# Patient Record
Sex: FEMALE | Race: BLACK OR AFRICAN AMERICAN | URBAN - METROPOLITAN AREA
[De-identification: names, ages, dates, MRNs, and addresses within clinical notes are randomized per-mention and may not be internally consistent; named-entity substitution may affect disease eponyms.]

---

## 2023-05-04 ENCOUNTER — INITIAL PRENATAL (OUTPATIENT)
Dept: OBGYN CLINIC | Age: 33
End: 2023-05-04

## 2023-05-04 ENCOUNTER — HOSPITAL ENCOUNTER (OUTPATIENT)
Dept: LAB | Age: 33
Discharge: HOME | End: 2023-05-04

## 2023-05-04 DIAGNOSIS — O20.0 THREATENED ABORTION: Primary | ICD-10-CM

## 2023-05-04 RX ORDER — MISOPROSTOL 200 UG/1
TABLET ORAL
Qty: 10 TABLET | Refills: 0 | Status: SHIPPED | OUTPATIENT
Start: 2023-05-04

## 2023-05-04 RX ORDER — HYDROCODONE BITARTRATE AND ACETAMINOPHEN 5; 325 MG/1; MG/1
1 TABLET ORAL
Qty: 5 TABLET | Refills: 0 | Status: SHIPPED | OUTPATIENT
Start: 2023-05-04 | End: 2023-05-07

## 2023-05-04 RX ORDER — ONDANSETRON 8 MG/1
8 TABLET, ORALLY DISINTEGRATING ORAL
Qty: 15 TABLET | Refills: 0 | Status: SHIPPED | OUTPATIENT
Start: 2023-05-04

## 2023-05-05 LAB
ABO + RH BLD: NORMAL
BLOOD BANK CMNT PATIENT-IMP: NORMAL
BLOOD GROUP ANTIBODIES SERPL: NORMAL
ERYTHROCYTE [DISTWIDTH] IN BLOOD BY AUTOMATED COUNT: 13.3 % (ref 11.5–14.5)
HCG SERPL-ACNC: 845 MIU/ML (ref 0–6)
HCT VFR BLD AUTO: 41.6 % (ref 35–47)
HGB BLD-MCNC: 12.9 G/DL (ref 11.5–16)
MCH RBC QN AUTO: 30 PG (ref 26–34)
MCHC RBC AUTO-ENTMCNC: 31 G/DL (ref 30–36.5)
MCV RBC AUTO: 96.7 FL (ref 80–99)
NRBC # BLD: 0 K/UL (ref 0–0.01)
NRBC BLD-RTO: 0 PER 100 WBC
PLATELET # BLD AUTO: 129 K/UL (ref 150–400)
PMV BLD AUTO: 12.2 FL (ref 8.9–12.9)
RBC # BLD AUTO: 4.3 M/UL (ref 3.8–5.2)
SPECIMEN EXP DATE BLD: NORMAL
WBC # BLD AUTO: 8.3 K/UL (ref 3.6–11)

## 2023-05-16 NOTE — PROGRESS NOTES
Magdaleno Pop is a 33 y.o. female presents for a follow-up visit for SAB. Had ultrasound in our office today. Patient saw our office on 5/4/2023 for threatened miscarriage and had ultrasound in our practice on 5/4/2023. At that visit, she reported having 3 weeks of heavy cramping and bleeding. The bleeding and cramping has since resolved. No chief complaint on file. No LMP recorded. Birth Control: none. Last Pap: 3/27/2023; NILM; HPV+    She reports the symptoms are resolved. Examination chaperoned by Su Squires LPN.

## 2023-05-18 ENCOUNTER — OFFICE VISIT (OUTPATIENT)
Age: 33
End: 2023-05-18
Payer: COMMERCIAL

## 2023-05-18 VITALS — WEIGHT: 197.2 LBS | SYSTOLIC BLOOD PRESSURE: 123 MMHG | DIASTOLIC BLOOD PRESSURE: 59 MMHG

## 2023-05-18 DIAGNOSIS — O03.9 COMPLETE ABORTION: Primary | ICD-10-CM

## 2023-05-18 PROCEDURE — 99212 OFFICE O/P EST SF 10 MIN: CPT | Performed by: OBSTETRICS & GYNECOLOGY

## 2023-05-18 NOTE — PROGRESS NOTES
OB/GYN Problem VIsit    HPI  Nimo Gloria is a No obstetric history on file. ,  33 y.o. female who presents for a problem visit. Followup SAB. Last US showed sac in the cervix. She was given Miso 1000mcg took twice and passed the tissue. Not  bleeding at all now. No SE from meds. Wants conception again    Pt states abnormal pap HPV pos in March. Needs colpo and wants to do that here. No past medical history on file. No past surgical history on file. Social History     Occupational History    Not on file   Tobacco Use    Smoking status: Not on file    Smokeless tobacco: Not on file   Substance and Sexual Activity    Alcohol use: Not on file    Drug use: Not on file    Sexual activity: Not on file     No family history on file.     No Known Allergies  Prior to Admission medications    Not on File        Review of Systems: History obtained from the patient  Constitutional: negative for weight loss, fever, night sweats  Breast: negative for breast lumps, nipple discharge, galactorrhea  GI: negative for change in bowel habits, abdominal pain, black or bloody stools  : negative for frequency, dysuria, hematuria, vaginal discharge  MSK: negative for back pain, joint pain, muscle pain  Skin: negative for itching, rash, hives  Psych: negative for anxiety, depression, change in mood      Objective:  BP (!) 123/59   Wt 197 lb 3.2 oz (89.4 kg)     Physical Exam:   PHYSICAL EXAMINATION    Constitutional  Appearance: well-nourished, well developed, alert, in no acute distress      Gastrointestinal  Abdominal Examination: abdomen non-tender to palpation, normal bowel sounds, no masses present  Liver and spleen: no hepatomegaly present, spleen not palpable  Hernias: no hernias identified    Genitourinary  External Genitalia: normal appearance for age, no discharge present, no tenderness present, no inflammatory lesions present, no masses present, no atrophy present  Vagina: normal vaginal vault without central or

## 2023-05-19 LAB — HCG SERPL-ACNC: 3 MIU/ML (ref 0–6)

## 2023-06-05 ENCOUNTER — PROCEDURE VISIT (OUTPATIENT)
Age: 33
End: 2023-06-05

## 2023-06-05 VITALS — WEIGHT: 198 LBS | SYSTOLIC BLOOD PRESSURE: 128 MMHG | DIASTOLIC BLOOD PRESSURE: 56 MMHG

## 2023-06-05 DIAGNOSIS — R87.618 ABNORMAL PAPANICOLAOU SMEAR OF CERVIX WITH POSITIVE HUMAN PAPILLOMA VIRUS (HPV) TEST: Primary | ICD-10-CM

## 2023-06-05 LAB
HCG, PREGNANCY, URINE, POC: NEGATIVE
VALID INTERNAL CONTROL, POC: YES

## 2023-06-05 NOTE — PROGRESS NOTES
Procedure Note: Colposcopy    Octaviano Clarke is a No obstetric history on file. ,  33 y.o. female Black /  No LMP recorded. She presents for colposcopy for evaluation of a cervical abnormality with a pap smear abnormality consisting of ASCUS and HPV. After being presented with the risks, benefits and alternatives has she signed a consent for the procedure. She states that she understands the need for the procedure and has no further questions. She was informed that she may experience discomfort. Procedure:  She was positioned in the dorsal lithotomy position and a speculum was inserted into the vagina. Dilute acetic acid was applied to the cervix. The colposcope was used to visualize the cervix. Procedure: The transformation zone was completely visualized. Findings: This colposcopy was satisfactory. The procedure was notable for white epithelium on the cervix. Biopsies were taken from the cervix . See accompanying diagram for biopsy sites. Monsels was applied to the cervix. Endocervical currettage: An endocervical curettage was performed. Post Procedure Status: The patient tolerated the procedure well with minimal discomfort. She was observed for 10 minutes and released in good condition.     Physical Exam  Genitourinary:          A: ASCUS HPV pos  Looks LG    P: LEEP if HG

## 2023-06-07 ENCOUNTER — TELEPHONE (OUTPATIENT)
Age: 33
End: 2023-06-07

## 2023-06-07 NOTE — TELEPHONE ENCOUNTER
Tyrel Ochoa from [facility] calling to verify biopsies received. She states the requisition indicated only Endocervical biopsy but received 2 formalin containers, one says ENDO, other says ECTO. This MA s/w Dr. Dameon Jackson who confirmed lab was to receive 2 specimens (ENDO+ECTO). Verbal order provided to Tyrel Ochoa. A new requisition was not needed.

## 2023-07-13 NOTE — PROGRESS NOTES
Dede Saba is a 33 y.o. female presents for a problem visit. Chief Complaint   Patient presents with    Other     Discuss LEEP     Patient's last menstrual period was 06/19/2023 (exact date). Birth Control: condoms always. Last Pap: 3/2023 abnormal Pap with HPV+ per pt   6/5/2023; Colpo CIN1; CIN2        Examination chaperoned by Juliocesar Green LPN.

## 2023-07-14 ENCOUNTER — OFFICE VISIT (OUTPATIENT)
Age: 33
End: 2023-07-14
Payer: COMMERCIAL

## 2023-07-14 VITALS — DIASTOLIC BLOOD PRESSURE: 84 MMHG | SYSTOLIC BLOOD PRESSURE: 135 MMHG | WEIGHT: 204 LBS

## 2023-07-14 DIAGNOSIS — N87.1 CIN II (CERVICAL INTRAEPITHELIAL NEOPLASIA II): Primary | ICD-10-CM

## 2023-07-14 PROCEDURE — 99212 OFFICE O/P EST SF 10 MIN: CPT | Performed by: OBSTETRICS & GYNECOLOGY

## 2023-07-14 NOTE — PROGRESS NOTES
OB/GYN Problem VIsit    HPI  Juli Weaver is a No obstetric history on file. ,  33 y.o. female who presents for a problem visit. Colposcopy revealed MARKO-2. She is here discussed LEEP procedure. Her only pregnancy has been a miscarriage. We discussed in detail the procedure and recovery. The risk benefits and alternatives were also discussed including doing nothing as these occasionally spontaneously resolved. The current recommendation is removal.        History reviewed. No pertinent past medical history. History reviewed. No pertinent surgical history.   Social History     Occupational History    Not on file   Tobacco Use    Smoking status: Never    Smokeless tobacco: Never   Vaping Use    Vaping Use: Never used   Substance and Sexual Activity    Alcohol use: Yes     Comment: occasional    Drug use: Never    Sexual activity: Yes     Partners: Male     Birth control/protection: Condom Male     Comment: always     Family History   Problem Relation Age of Onset    Breast Cancer Maternal Grandmother        No Known Allergies  Prior to Admission medications    Not on File        Review of Systems: History obtained from the patient  Constitutional: negative for weight loss, fever, night sweats  Breast: negative for breast lumps, nipple discharge, galactorrhea  GI: negative for change in bowel habits, abdominal pain, black or bloody stools  : negative for frequency, dysuria, hematuria, vaginal discharge  MSK: negative for back pain, joint pain, muscle pain  Skin: negative for itching, rash, hives  Psych: negative for anxiety, depression, change in mood      Objective:  /84   Wt 204 lb (92.5 kg)   LMP 06/19/2023 (Exact Date)     Physical Exam:   PHYSICAL EXAMINATION    Constitutional  Appearance: well-nourished, well developed, alert, in no acute distress      Skin  General Inspection: no rash, no lesions identified    Neurologic/Psychiatric  Mental Status:  Orientation: grossly oriented to person, place

## 2023-07-19 ENCOUNTER — PREP FOR PROCEDURE (OUTPATIENT)
Facility: HOSPITAL | Age: 33
End: 2023-07-19

## 2023-07-19 DIAGNOSIS — N87.1 CIN II (CERVICAL INTRAEPITHELIAL NEOPLASIA II): Primary | ICD-10-CM

## 2023-08-08 ENCOUNTER — TELEPHONE (OUTPATIENT)
Age: 33
End: 2023-08-08

## 2023-09-25 ENCOUNTER — TELEPHONE (OUTPATIENT)
Age: 33
End: 2023-09-25

## 2023-09-26 ENCOUNTER — PREP FOR PROCEDURE (OUTPATIENT)
Facility: HOSPITAL | Age: 33
End: 2023-09-26

## 2023-09-26 DIAGNOSIS — N87.1 CERVICAL INTRAEPITHELIAL NEOPLASIA II: Primary | ICD-10-CM

## 2023-09-27 ENCOUNTER — ANESTHESIA EVENT (OUTPATIENT)
Facility: HOSPITAL | Age: 33
End: 2023-09-27
Payer: COMMERCIAL

## 2023-09-27 ENCOUNTER — HOSPITAL ENCOUNTER (OUTPATIENT)
Facility: HOSPITAL | Age: 33
Setting detail: OUTPATIENT SURGERY
Discharge: HOME | End: 2023-09-27
Attending: OBSTETRICS & GYNECOLOGY | Admitting: OBSTETRICS & GYNECOLOGY
Payer: COMMERCIAL

## 2023-09-27 ENCOUNTER — ANESTHESIA (OUTPATIENT)
Facility: HOSPITAL | Age: 33
End: 2023-09-27
Payer: COMMERCIAL

## 2023-09-27 VITALS
HEART RATE: 88 BPM | DIASTOLIC BLOOD PRESSURE: 60 MMHG | SYSTOLIC BLOOD PRESSURE: 127 MMHG | TEMPERATURE: 98.3 F | WEIGHT: 201 LBS | RESPIRATION RATE: 18 BRPM | BODY MASS INDEX: 31.55 KG/M2 | HEIGHT: 67 IN | OXYGEN SATURATION: 96 %

## 2023-09-27 DIAGNOSIS — N87.1 CERVICAL INTRAEPITHELIAL NEOPLASIA II: ICD-10-CM

## 2023-09-27 LAB
COMMENT:: NORMAL
HCG UR QL: NEGATIVE
SPECIMEN HOLD: NORMAL

## 2023-09-27 PROCEDURE — 2500000003 HC RX 250 WO HCPCS: Performed by: OBSTETRICS & GYNECOLOGY

## 2023-09-27 PROCEDURE — 7100000011 HC PHASE II RECOVERY - ADDTL 15 MIN: Performed by: OBSTETRICS & GYNECOLOGY

## 2023-09-27 PROCEDURE — 6360000002 HC RX W HCPCS: Performed by: ANESTHESIOLOGY

## 2023-09-27 PROCEDURE — 6370000000 HC RX 637 (ALT 250 FOR IP): Performed by: ANESTHESIOLOGY

## 2023-09-27 PROCEDURE — 3700000001 HC ADD 15 MINUTES (ANESTHESIA): Performed by: OBSTETRICS & GYNECOLOGY

## 2023-09-27 PROCEDURE — 3600000013 HC SURGERY LEVEL 3 ADDTL 15MIN: Performed by: OBSTETRICS & GYNECOLOGY

## 2023-09-27 PROCEDURE — 7100000000 HC PACU RECOVERY - FIRST 15 MIN: Performed by: OBSTETRICS & GYNECOLOGY

## 2023-09-27 PROCEDURE — 88307 TISSUE EXAM BY PATHOLOGIST: CPT

## 2023-09-27 PROCEDURE — 2709999900 HC NON-CHARGEABLE SUPPLY: Performed by: OBSTETRICS & GYNECOLOGY

## 2023-09-27 PROCEDURE — 7100000001 HC PACU RECOVERY - ADDTL 15 MIN: Performed by: OBSTETRICS & GYNECOLOGY

## 2023-09-27 PROCEDURE — 36415 COLL VENOUS BLD VENIPUNCTURE: CPT

## 2023-09-27 PROCEDURE — 6370000000 HC RX 637 (ALT 250 FOR IP): Performed by: OBSTETRICS & GYNECOLOGY

## 2023-09-27 PROCEDURE — 57522 CONIZATION OF CERVIX: CPT | Performed by: OBSTETRICS & GYNECOLOGY

## 2023-09-27 PROCEDURE — 3600000003 HC SURGERY LEVEL 3 BASE: Performed by: OBSTETRICS & GYNECOLOGY

## 2023-09-27 PROCEDURE — 2580000003 HC RX 258: Performed by: ANESTHESIOLOGY

## 2023-09-27 PROCEDURE — 7100000010 HC PHASE II RECOVERY - FIRST 15 MIN: Performed by: OBSTETRICS & GYNECOLOGY

## 2023-09-27 PROCEDURE — 3700000000 HC ANESTHESIA ATTENDED CARE: Performed by: OBSTETRICS & GYNECOLOGY

## 2023-09-27 PROCEDURE — 88305 TISSUE EXAM BY PATHOLOGIST: CPT

## 2023-09-27 PROCEDURE — 81025 URINE PREGNANCY TEST: CPT

## 2023-09-27 RX ORDER — DEXAMETHASONE SODIUM PHOSPHATE 4 MG/ML
INJECTION, SOLUTION INTRA-ARTICULAR; INTRALESIONAL; INTRAMUSCULAR; INTRAVENOUS; SOFT TISSUE PRN
Status: DISCONTINUED | OUTPATIENT
Start: 2023-09-27 | End: 2023-09-27 | Stop reason: SDUPTHER

## 2023-09-27 RX ORDER — SODIUM CHLORIDE, SODIUM LACTATE, POTASSIUM CHLORIDE, CALCIUM CHLORIDE 600; 310; 30; 20 MG/100ML; MG/100ML; MG/100ML; MG/100ML
INJECTION, SOLUTION INTRAVENOUS CONTINUOUS
Status: DISCONTINUED | OUTPATIENT
Start: 2023-09-27 | End: 2023-09-27 | Stop reason: HOSPADM

## 2023-09-27 RX ORDER — ACETIC ACID 5 %
LIQUID (ML) MISCELLANEOUS PRN
Status: DISCONTINUED | OUTPATIENT
Start: 2023-09-27 | End: 2023-09-27 | Stop reason: ALTCHOICE

## 2023-09-27 RX ORDER — PROPOFOL 10 MG/ML
INJECTION, EMULSION INTRAVENOUS PRN
Status: DISCONTINUED | OUTPATIENT
Start: 2023-09-27 | End: 2023-09-27 | Stop reason: SDUPTHER

## 2023-09-27 RX ORDER — ACETAMINOPHEN 325 MG/1
650 TABLET ORAL ONCE
Status: COMPLETED | OUTPATIENT
Start: 2023-09-27 | End: 2023-09-27

## 2023-09-27 RX ORDER — IODINE SOLUTION STRONG 5% (LUGOL'S) 5 %
SOLUTION ORAL PRN
Status: DISCONTINUED | OUTPATIENT
Start: 2023-09-27 | End: 2023-09-27 | Stop reason: ALTCHOICE

## 2023-09-27 RX ORDER — LABETALOL HYDROCHLORIDE 5 MG/ML
10 INJECTION, SOLUTION INTRAVENOUS
Status: DISCONTINUED | OUTPATIENT
Start: 2023-09-27 | End: 2023-09-27 | Stop reason: HOSPADM

## 2023-09-27 RX ORDER — FERRIC SUBSULFATE 0.21 G/G
LIQUID TOPICAL PRN
Status: DISCONTINUED | OUTPATIENT
Start: 2023-09-27 | End: 2023-09-27 | Stop reason: ALTCHOICE

## 2023-09-27 RX ORDER — ONDANSETRON 2 MG/ML
INJECTION INTRAMUSCULAR; INTRAVENOUS PRN
Status: DISCONTINUED | OUTPATIENT
Start: 2023-09-27 | End: 2023-09-27 | Stop reason: SDUPTHER

## 2023-09-27 RX ORDER — DROPERIDOL 2.5 MG/ML
0.62 INJECTION, SOLUTION INTRAMUSCULAR; INTRAVENOUS
Status: DISCONTINUED | OUTPATIENT
Start: 2023-09-27 | End: 2023-09-27 | Stop reason: HOSPADM

## 2023-09-27 RX ORDER — LIDOCAINE HYDROCHLORIDE 10 MG/ML
1 INJECTION, SOLUTION EPIDURAL; INFILTRATION; INTRACAUDAL; PERINEURAL
Status: DISCONTINUED | OUTPATIENT
Start: 2023-09-27 | End: 2023-09-27 | Stop reason: HOSPADM

## 2023-09-27 RX ORDER — DIPHENHYDRAMINE HYDROCHLORIDE 50 MG/ML
12.5 INJECTION INTRAMUSCULAR; INTRAVENOUS
Status: DISCONTINUED | OUTPATIENT
Start: 2023-09-27 | End: 2023-09-27 | Stop reason: HOSPADM

## 2023-09-27 RX ORDER — ONDANSETRON 2 MG/ML
4 INJECTION INTRAMUSCULAR; INTRAVENOUS
Status: DISCONTINUED | OUTPATIENT
Start: 2023-09-27 | End: 2023-09-27 | Stop reason: HOSPADM

## 2023-09-27 RX ORDER — MEPERIDINE HYDROCHLORIDE 25 MG/ML
12.5 INJECTION INTRAMUSCULAR; INTRAVENOUS; SUBCUTANEOUS EVERY 5 MIN PRN
Status: DISCONTINUED | OUTPATIENT
Start: 2023-09-27 | End: 2023-09-27 | Stop reason: HOSPADM

## 2023-09-27 RX ORDER — LIDOCAINE HYDROCHLORIDE AND EPINEPHRINE 10; 10 MG/ML; UG/ML
INJECTION, SOLUTION INFILTRATION; PERINEURAL PRN
Status: DISCONTINUED | OUTPATIENT
Start: 2023-09-27 | End: 2023-09-27 | Stop reason: ALTCHOICE

## 2023-09-27 RX ORDER — MIDAZOLAM HYDROCHLORIDE 1 MG/ML
INJECTION INTRAMUSCULAR; INTRAVENOUS PRN
Status: DISCONTINUED | OUTPATIENT
Start: 2023-09-27 | End: 2023-09-27 | Stop reason: SDUPTHER

## 2023-09-27 RX ADMIN — ACETAMINOPHEN 650 MG: 325 TABLET ORAL at 06:25

## 2023-09-27 RX ADMIN — MIDAZOLAM HYDROCHLORIDE 2 MG: 1 INJECTION, SOLUTION INTRAMUSCULAR; INTRAVENOUS at 07:31

## 2023-09-27 RX ADMIN — ONDANSETRON HYDROCHLORIDE 4 MG: 2 SOLUTION INTRAMUSCULAR; INTRAVENOUS at 07:38

## 2023-09-27 RX ADMIN — PROPOFOL 200 MG: 10 INJECTION, EMULSION INTRAVENOUS at 07:38

## 2023-09-27 RX ADMIN — DEXAMETHASONE SODIUM PHOSPHATE 4 MG: 4 INJECTION, SOLUTION INTRAMUSCULAR; INTRAVENOUS at 07:38

## 2023-09-27 RX ADMIN — SODIUM CHLORIDE, POTASSIUM CHLORIDE, SODIUM LACTATE AND CALCIUM CHLORIDE: 600; 310; 30; 20 INJECTION, SOLUTION INTRAVENOUS at 06:26

## 2023-09-27 ASSESSMENT — PAIN - FUNCTIONAL ASSESSMENT: PAIN_FUNCTIONAL_ASSESSMENT: NONE - DENIES PAIN

## 2023-09-27 NOTE — ANESTHESIA POSTPROCEDURE EVALUATION
Department of Anesthesiology  Postprocedure Note    Patient: Michelle Trivedi  MRN: [id number]  YOB: 1990  Date of evaluation: 9/27/2023      Procedure Summary     Date: 09/27/23 Room / Location: [facility] MAIN OR  / [facility] MAIN OR    Anesthesia Start: 5051 Anesthesia Stop: 9291    Procedure: LOOP ELECTROSURGICAL EXCISION PROCEDURE (Cervix) Diagnosis:       Moderate dysplasia of cervix      (Moderate dysplasia of cervix [N87.1])    Surgeons: Pedro Pablo Mays MD Responsible Provider: Gab Hampton MD    Anesthesia Type: general ASA Status: 1          Anesthesia Type: No value filed.     Isrrael Phase I: Isrrael Score: 10    Isrrael Phase II:        Anesthesia Post Evaluation    Patient location during evaluation: PACU  Patient participation: complete - patient participated  Level of consciousness: awake  Airway patency: patent  Nausea & Vomiting: no vomiting and no nausea  Complications: no  Cardiovascular status: hemodynamically stable  Respiratory status: acceptable  Hydration status: stable  Pain management: adequate

## 2023-09-27 NOTE — OP NOTE
Operative Note      Patient: Prabhu Boone  YOB: 1990  MRN: [id number]    Date of Procedure: 9/27/2023    Pre-Op Diagnosis Codes:     * Moderate dysplasia of cervix [N87.1]    Post-Op Diagnosis: Same       Procedure(s):  LOOP ELECTROSURGICAL EXCISION PROCEDURE, ECC, attempted colpo    Surgeon(s):  Salazar Srinivasan MD    Assistant:   * No surgical staff found *    Anesthesia: Monitor Anesthesia Care    Estimated Blood Loss (mL): Minimal    Complications: None    Specimens:   ID Type Source Tests Collected by Time Destination   A : ectocervical LEEP Tissue Cervix SURGICAL PATHOLOGY Salazar Srinivasan MD 9/27/2023 0803    B : Endocervical LEEP Tissue Cervix SURGICAL PATHOLOGY Salazar Srinivasan MD 9/27/2023 0809    C : Endocervical Curettings Tissue Cervix SURGICAL PATHOLOGY Salazar Srinivasan MD 9/27/2023 8080        Implants:  * No implants in log *      Drains: * No LDAs found *    Findings: lesion noted on ectocervix        Detailed Description of Procedure:   After proper patient identification and consent were obtained the patient was taken to the operating room where after adequate induction of MAC anesthesia she was placed in the dorsolithotomy position. A coated speculum was placed in the vagina and the cervix was visualized. It was painted with Lugol's solution and area of nonstaining was noted. Colposcopy was attempted but the colposcope was broken. The cervix was then injected with 10 cc of lidocaine with epinephrine in all 4 quadrants. Using a medium size loop and ectocervical specimen was obtained and sent for pathology using a 10 x 10 mm loop and endocervical specimen was obtained in a Top-Hat fashion and sent for pathology. Using a small curette and endocervical curettage was performed and all quadrants of the endocervical canal and a small specimen was sent for pathology. Hemostasis was obtained with the aid of rollerball cautery and Monsel solution.   At the end of the procedure pad needle and sponge

## 2023-10-04 NOTE — PROGRESS NOTES
Henry Marie is a 29 y.o. female presents for a problem visit. Chief Complaint   Patient presents with    Follow-up     Patient's last menstrual period was 09/28/2023. The patient is reporting having:  Follow up to discuss LEEP 9/27/2023, patient states she had no issues after LEEP but has started bleeding today and it is not her cycle      Examination chaperoned by Anjelica Christensen MA.

## 2023-10-13 ENCOUNTER — OFFICE VISIT (OUTPATIENT)
Age: 33
End: 2023-10-13
Payer: COMMERCIAL

## 2023-10-13 VITALS — BODY MASS INDEX: 32.11 KG/M2 | WEIGHT: 205 LBS | SYSTOLIC BLOOD PRESSURE: 130 MMHG | DIASTOLIC BLOOD PRESSURE: 88 MMHG

## 2023-10-13 DIAGNOSIS — N87.1 CIN II (CERVICAL INTRAEPITHELIAL NEOPLASIA II): Primary | ICD-10-CM

## 2023-10-13 PROCEDURE — 99212 OFFICE O/P EST SF 10 MIN: CPT | Performed by: OBSTETRICS & GYNECOLOGY

## 2023-10-13 NOTE — PROGRESS NOTES
OB/GYN Problem VIsit    HPI  Reema Griggs is a No obstetric history on file. ,  29 y.o. female who presents for a problem visit. LEEP 4 weeks ago for MARKO II. Pathology was negative. Patient's last menstrual period was 09/28/2023. Had normal cycle now with spotting - likely around ovulation. Last sex active prior to LEEP          Past Medical History:   Diagnosis Date    GERD (gastroesophageal reflux disease)     no longer taking meds     Past Surgical History:   Procedure Laterality Date    LEEP N/A 9/27/2023    LOOP ELECTROSURGICAL EXCISION PROCEDURE performed by Yarely Meier MD at [address] EXTRACTION       Social History     Occupational History    Not on file   Tobacco Use    Smoking status: Never    Smokeless tobacco: Never   Vaping Use    Vaping Use: Never used   Substance and Sexual Activity    Alcohol use: Yes     Alcohol/week: 2.0 standard drinks of alcohol     Types: 2 Shots of liquor per week    Drug use: Never    Sexual activity: Yes     Partners: Male     Birth control/protection: Condom Male     Comment: always     Family History   Problem Relation Age of Onset    Breast Cancer Maternal Grandmother        No Known Allergies  Prior to Admission medications    Medication Sig Start Date End Date Taking?  Authorizing Provider   Multiple Vitamin (MULTIVITAMIN PO) Take 1 tablet by mouth daily   Yes Provider, MD Missy        Review of Systems: History obtained from the patient  Constitutional: negative for weight loss, fever, night sweats  Breast: negative for breast lumps, nipple discharge, galactorrhea  GI: negative for change in bowel habits, abdominal pain, black or bloody stools  : negative for frequency, dysuria, hematuria, vaginal discharge  MSK: negative for back pain, joint pain, muscle pain  Skin: negative for itching, rash, hives  Psych: negative for anxiety, depression, change in mood      Objective:  /88   Wt 93 kg (205 lb)

## 2024-04-22 ENCOUNTER — OFFICE VISIT (OUTPATIENT)
Age: 34
End: 2024-04-22

## 2024-04-22 VITALS — DIASTOLIC BLOOD PRESSURE: 76 MMHG | WEIGHT: 227.2 LBS | SYSTOLIC BLOOD PRESSURE: 123 MMHG | BODY MASS INDEX: 35.58 KG/M2

## 2024-04-22 DIAGNOSIS — N87.1 CIN II (CERVICAL INTRAEPITHELIAL NEOPLASIA II): Primary | ICD-10-CM

## 2024-04-22 DIAGNOSIS — N92.6 MISSED MENSES: ICD-10-CM

## 2024-04-22 LAB
HCG, PREGNANCY, URINE, POC: POSITIVE
VALID INTERNAL CONTROL, POC: YES

## 2024-04-22 RX ORDER — SERTRALINE HYDROCHLORIDE 100 MG/1
TABLET, FILM COATED ORAL
COMMUNITY
Start: 2024-04-19

## 2024-04-22 SDOH — ECONOMIC STABILITY: HOUSING INSECURITY
IN THE LAST 12 MONTHS, WAS THERE A TIME WHEN YOU DID NOT HAVE A STEADY PLACE TO SLEEP OR SLEPT IN A SHELTER (INCLUDING NOW)?: PATIENT DECLINED

## 2024-04-22 SDOH — ECONOMIC STABILITY: INCOME INSECURITY: HOW HARD IS IT FOR YOU TO PAY FOR THE VERY BASICS LIKE FOOD, HOUSING, MEDICAL CARE, AND HEATING?: PATIENT DECLINED

## 2024-04-22 SDOH — ECONOMIC STABILITY: FOOD INSECURITY: WITHIN THE PAST 12 MONTHS, YOU WORRIED THAT YOUR FOOD WOULD RUN OUT BEFORE YOU GOT MONEY TO BUY MORE.: PATIENT DECLINED

## 2024-04-22 SDOH — ECONOMIC STABILITY: FOOD INSECURITY: WITHIN THE PAST 12 MONTHS, THE FOOD YOU BOUGHT JUST DIDN'T LAST AND YOU DIDN'T HAVE MONEY TO GET MORE.: PATIENT DECLINED

## 2024-04-22 ASSESSMENT — PATIENT HEALTH QUESTIONNAIRE - PHQ9
SUM OF ALL RESPONSES TO PHQ QUESTIONS 1-9: 0
SUM OF ALL RESPONSES TO PHQ9 QUESTIONS 1 & 2: 0
2. FEELING DOWN, DEPRESSED OR HOPELESS: NOT AT ALL
1. LITTLE INTEREST OR PLEASURE IN DOING THINGS: NOT AT ALL
SUM OF ALL RESPONSES TO PHQ QUESTIONS 1-9: 0

## 2024-04-22 NOTE — PROGRESS NOTES
Kori Rashid is a 33 y.o. female presents for a problem visit.    Chief Complaint   Patient presents with    Colposcopy       Problems: Abnormal Pap       No LMP recorded.      Last Pap: 3/27/2023 NILM/ HPV+  Colpo 6/5/2023 CIN2  LEEP 9/27/2023 negative with residual displasia      Chart reviewed for the following:   Atiya LOYOLA MA, have reviewed the History, Physical and updated the Allergic reactions for Kori Rashid     TIME OUT performed immediately prior to start of procedure:   Atiya LOYOLA MA, have performed the following reviews on Kori Rashid prior to the start of the procedure:            * Patient was identified by name and date of birth   * Agreement on procedure being performed was verified  * Risks and Benefits explained to the patient  * Procedure site verified and marked as necessary  * Patient was positioned for comfort  * Consent was signed and verified     Time: 1:40 PM    Date of procedure: 4/22/2024    Procedure performed by:  Oralia Crowley MD       Provider assisted by: Atiya Trimble MA    Patient assisted by: self    How tolerated by patient: tolerated the procedure well with no complications    Post Procedural Pain Scale: 2 - Hurts Little Bit    Comments: none    Examination chaperoned by Atiya Trimble MA.  
Kori Rashid is a 33 y.o. female presents for a problem visit.    No chief complaint on file.      Problems: Abnormal Pap       No LMP recorded.    Birth Control: {contraception:855183}.    Last Pap: 3/27/2023 NILM/ HPV+  Colpo 6/5/2023 CIN2  LEEP 9/27/2023 negative with residual displasia      Chart reviewed for the following:   Sherie LOYOLA LPN, have reviewed the History, Physical and updated the Allergic reactions for Kori Rashid     TIME OUT performed immediately prior to start of procedure:   Sherie LOYOLA LPN, have performed the following reviews on Kori Rashid prior to the start of the procedure:            * Patient was identified by name and date of birth   * Agreement on procedure being performed was verified  * Risks and Benefits explained to the patient  * Procedure site verified and marked as necessary  * Patient was positioned for comfort  * Consent was signed and verified     Time: 1:40 PM    Date of procedure: 4/22/2024    Procedure performed by:  Oralia Crowley MD       Provider assisted by: Atiya Trimble MA    Patient assisted by: self    How tolerated by patient: tolerated the procedure well with no complications    Post Procedural Pain Scale: 2 - Hurts Little Bit    Comments: none    Examination chaperoned by Sherie Moreno LPN.  
currettage: An endocervical curettage was not performed.   Post Procedure Status: The patient tolerated the procedure well with minimal discomfort.   She was observed for 10 minutes and released in good condition.    Physical Exam  Genitourinary:           Check HCG and repeat in 2 days

## 2024-04-24 ENCOUNTER — NURSE ONLY (OUTPATIENT)
Age: 34
End: 2024-04-24

## 2024-04-24 DIAGNOSIS — N92.6 MISSED MENSES: Primary | ICD-10-CM

## 2024-04-25 ENCOUNTER — TELEPHONE (OUTPATIENT)
Age: 34
End: 2024-04-25

## 2024-04-25 NOTE — TELEPHONE ENCOUNTER
Two patient identifiers used    33 year old patient seen in the office on Monday for procedure    Patient had beta hcg checked on Wednesday and is calling to find out if she should repeat the lab tomorrow since she misunderstood about Monday getting the first  beta done..    Patient was advised per Dr. Crowley to come tomorrow for repeat beta hcg and was placed on the schedule for repeat beta at 1:30PM    Patient verbalized understanding.      Order placed for repeat beta hcg as per MD verbal order

## 2024-04-26 LAB — HCG INTACT+B SERPL-ACNC: 5 MIU/ML

## 2024-04-26 NOTE — TELEPHONE ENCOUNTER
Two patient identifiers used    Patient calling to say that she has been messaging Dr Crowley and does not need to have repeat blood work done today  Lab only appointment was cancelled    Patient verbalized understanding.

## 2024-04-27 LAB
CYTOLOGIST CVX/VAG CYTO: NORMAL
CYTOLOGY CVX/VAG DOC CYTO: NORMAL
CYTOLOGY CVX/VAG DOC THIN PREP: NORMAL
DX ICD CODE: NORMAL
HPV GENOTYPE REFLEX: NORMAL
HPV I/H RISK 4 DNA CVX QL PROBE+SIG AMP: NEGATIVE
Lab: NORMAL
Lab: NORMAL
OTHER STN SPEC: NORMAL
STAT OF ADQ CVX/VAG CYTO-IMP: NORMAL

## 2024-07-11 RX ORDER — PNV NO.95/FERROUS FUM/FOLIC AC 28MG-0.8MG
1 TABLET ORAL DAILY
Qty: 90 TABLET | Refills: 5 | Status: SHIPPED | OUTPATIENT
Start: 2024-07-11

## 2024-07-17 ENCOUNTER — LAB (OUTPATIENT)
Age: 34
End: 2024-07-17

## 2024-07-17 DIAGNOSIS — N96 HISTORY OF RECURRENT ABORTION, NOT CURRENTLY PREGNANT: Primary | ICD-10-CM

## 2024-07-18 LAB
HCG INTACT+B SERPL-ACNC: 2 MIU/ML
LA 2 SCREEN W REFLEX-IMP: NORMAL
SCREEN APTT: 29.7 SEC (ref 0–43.5)
SCREEN DRVVT: 31.9 SEC (ref 0–47)

## 2024-07-19 LAB
B2 GLYCOPROT1 IGG SER-ACNC: <9 GPI IGG UNITS (ref 0–20)
B2 GLYCOPROT1 IGM SER-ACNC: <9 GPI IGM UNITS (ref 0–32)

## 2024-07-20 LAB
CARDIOLIPIN IGG SER IA-ACNC: <9 GPL U/ML (ref 0–14)
CARDIOLIPIN IGM SER IA-ACNC: 13 MPL U/ML (ref 0–12)

## 2024-07-25 LAB
F2 C.20210G>A GENO BLD/T: NORMAL
F5 P.R506Q BLD/T QL: NORMAL
IMP & REVIEW OF LAB RESULTS: NORMAL
IMP & REVIEW OF LAB RESULTS: NORMAL

## 2024-07-31 ENCOUNTER — LAB (OUTPATIENT)
Age: 34
End: 2024-07-31

## 2024-07-31 DIAGNOSIS — N96 HISTORY OF RECURRENT SPONTANEOUS ABORTION, NOT CURRENTLY PREGNANT: Primary | ICD-10-CM

## 2024-07-31 LAB
T4 FREE SERPL-MCNC: 0.8 NG/DL (ref 0.8–1.5)
TSH SERPL DL<=0.05 MIU/L-ACNC: 1.04 UIU/ML (ref 0.36–3.74)

## 2024-08-02 LAB
CARDIOLIPIN IGG SER IA-ACNC: <9 GPL U/ML (ref 0–14)
CARDIOLIPIN IGM SER IA-ACNC: <9 MPL U/ML (ref 0–12)

## 2024-08-14 ENCOUNTER — TELEPHONE (OUTPATIENT)
Age: 34
End: 2024-08-14

## 2024-08-14 NOTE — TELEPHONE ENCOUNTER
Two patient identifiers used      33 year old patient last seen in the office on 4/22/2024    Patient has had recent blood work done and has seen all the lab results that are back and have been reviewed by MD    Patient is wondering about the labs that are still pending. Chromosome Analysis, Blood            This nurse reached out to Yibailin to check on the results.  Lab [facility] reports results for all but the chromosome analysis the expected eta for the results is 8/21/2024    This nurse attempted to reach the patient and left a general message about lab eta and patient sent a my chart message.

## 2024-08-15 LAB
CELLS ANALYZED: 20
CELLS COUNTED: 35
CELLS KARYOTYPED.TOTAL BLD/T: 2
CLINICAL CYTOGENETICIST SPEC: NORMAL
DIAGNOSTIC IMP SPEC-IMP: NORMAL
ISCN BAND LEVEL QL: 500
KARYOTYP BLD/T: NORMAL
SPECIMEN SOURCE: NORMAL

## 2025-02-10 NOTE — PROGRESS NOTES
Kori Rashid is a 34 y.o. female presents for a new pregnancy visit.    Chief Complaint   Patient presents with    Initial Prenatal Visit       Problems: Amenorrhea     Patient's last menstrual period was 2024.    Last Pap: 24,Normal, HPV Negative    LMP history:  The date of her LMP is  certain.  Her last menstrual period was normal and lasted for 4 to 5 days.  A urine pregnancy test was positive  weeks ago. She was not on the pill at conception.     Based on her LMP, her EDC is 10/5/25 and her EGA is 7 weeks,2 days. Her menstrual cycles are regular and occur approximately every 28 days and range from 3 to 5 days. The last menses lasted  the usual number of days.      Ultrasound data:  She had an ultrasound done by the ultrasound tech today which revealed a viable dow pregnancy with a gestational age of 7 weeks and 3 days giving an EDC of 10/6/25.    Pregnancy symptoms:    Since her LMP she has experienced urinary frequency, breast tenderness, and nausea.   She has not been vomiting over the last few weeks.  Associated signs and symptoms which she denies: dysuria, discharge, vaginal bleeding.    She states she has gained weight:  Approximately 5 pounds over the last few weeks.    Relevant past pregnancy history:   She has the following pregnancy history:     She has no history of  delivery.    Relevant past medical history:(relevant to this pregnancy): noncontributory.      Her occupation is: [employer] .     1. Have you been to the ER, urgent care clinic, or hospitalized since your last visit? No    2. Have you seen or consulted any other health care providers outside of the [facility] since your last visit? No    Examination chaperoned by Tracy Terrell LPN.

## 2025-02-19 SDOH — ECONOMIC STABILITY: INCOME INSECURITY: IN THE LAST 12 MONTHS, WAS THERE A TIME WHEN YOU WERE NOT ABLE TO PAY THE MORTGAGE OR RENT ON TIME?: NO

## 2025-02-19 SDOH — ECONOMIC STABILITY: TRANSPORTATION INSECURITY
IN THE PAST 12 MONTHS, HAS LACK OF TRANSPORTATION KEPT YOU FROM MEETINGS, WORK, OR FROM GETTING THINGS NEEDED FOR DAILY LIVING?: NO

## 2025-02-19 SDOH — ECONOMIC STABILITY: TRANSPORTATION INSECURITY
IN THE PAST 12 MONTHS, HAS THE LACK OF TRANSPORTATION KEPT YOU FROM MEDICAL APPOINTMENTS OR FROM GETTING MEDICATIONS?: NO

## 2025-02-19 SDOH — ECONOMIC STABILITY: FOOD INSECURITY: WITHIN THE PAST 12 MONTHS, YOU WORRIED THAT YOUR FOOD WOULD RUN OUT BEFORE YOU GOT MONEY TO BUY MORE.: NEVER TRUE

## 2025-02-19 SDOH — ECONOMIC STABILITY: FOOD INSECURITY: WITHIN THE PAST 12 MONTHS, THE FOOD YOU BOUGHT JUST DIDN'T LAST AND YOU DIDN'T HAVE MONEY TO GET MORE.: NEVER TRUE

## 2025-02-20 ENCOUNTER — INITIAL PRENATAL (OUTPATIENT)
Age: 35
End: 2025-02-20

## 2025-02-20 VITALS
DIASTOLIC BLOOD PRESSURE: 75 MMHG | HEIGHT: 67 IN | WEIGHT: 244.4 LBS | SYSTOLIC BLOOD PRESSURE: 132 MMHG | BODY MASS INDEX: 38.36 KG/M2 | HEART RATE: 71 BPM

## 2025-02-20 DIAGNOSIS — Z36.9 ENCOUNTER FOR ANTENATAL SCREENING OF MOTHER: ICD-10-CM

## 2025-02-20 DIAGNOSIS — O09.90 SUPERVISION OF HIGH RISK PREGNANCY, ANTEPARTUM: Primary | ICD-10-CM

## 2025-02-20 DIAGNOSIS — Z11.51 SCREENING FOR HUMAN PAPILLOMAVIRUS: ICD-10-CM

## 2025-02-20 DIAGNOSIS — O99.210 OBESITY IN PREGNANCY: ICD-10-CM

## 2025-02-20 DIAGNOSIS — Z98.890 HISTORY OF LOOP ELECTRICAL EXCISION PROCEDURE (LEEP): ICD-10-CM

## 2025-02-20 DIAGNOSIS — Z34.90 PREGNANCY, UNSPECIFIED GESTATIONAL AGE: Primary | ICD-10-CM

## 2025-02-20 DIAGNOSIS — Z11.3 SCREENING FOR VENEREAL DISEASE: ICD-10-CM

## 2025-02-20 DIAGNOSIS — O26.20 HABITUAL ABORTER, ANTEPARTUM: ICD-10-CM

## 2025-02-20 DIAGNOSIS — Z3A.01 7 WEEKS GESTATION OF PREGNANCY: ICD-10-CM

## 2025-02-20 PROCEDURE — 0501F PRENATAL FLOW SHEET: CPT | Performed by: OBSTETRICS & GYNECOLOGY

## 2025-02-20 RX ORDER — PNV NO.95/FERROUS FUM/FOLIC AC 28MG-0.8MG
1 TABLET ORAL DAILY
Qty: 90 TABLET | Refills: 5 | Status: SHIPPED | OUTPATIENT
Start: 2025-02-20

## 2025-02-20 RX ORDER — ONDANSETRON 8 MG/1
8 TABLET, ORALLY DISINTEGRATING ORAL EVERY 8 HOURS PRN
Qty: 30 TABLET | Refills: 5 | Status: SHIPPED | OUTPATIENT
Start: 2025-02-20

## 2025-02-20 RX ORDER — DOXYLAMINE SUCCINATE AND PYRIDOXINE HYDROCHLORIDE, DELAYED RELEASE TABLETS 10 MG/10 MG 10; 10 MG/1; MG/1
2 TABLET, DELAYED RELEASE ORAL NIGHTLY
Qty: 120 TABLET | Refills: 3 | Status: SHIPPED | OUTPATIENT
Start: 2025-02-20

## 2025-02-20 NOTE — PROGRESS NOTES
Current pregnancy history:    Kori Rashid is a ,  34 y.o. female Black /  Patient's last menstrual period was 2024 (exact date)..  She presents for the evaluation of amenorrhea and a positive pregnancy test.    Per nursing Note:  Last Pap: 24,Normal, HPV Negative     LMP history:  The date of her LMP is  certain. Patient's last menstrual period was 2024 (exact date).  Her last menstrual period was normal and lasted for 4 to 5 days.  A urine pregnancy test was positive  weeks ago. She was not on the pill at conception.      Based on her LMP, her EDC is 10/7/25 and her EGA is 7 weeks,2 days. Her menstrual cycles are regular and occur approximately every 28 days and range from 3 to 5 days. The last menses lasted  the usual number of days.      Ultrasound data:  She had an ultrasound done by the ultrasound tech today which revealed a viable dow pregnancy with a gestational age of 7 weeks and 3 days giving an EDC of 10/6/25.     Pregnancy symptoms:     Since her LMP she has experienced urinary frequency, breast tenderness, and nausea.   She has not been vomiting over the last few weeks.  Associated signs and symptoms which she denies: dysuria, discharge, vaginal bleeding.     She states she has gained weight:  Approximately 5 pounds over the last few weeks.     Relevant past pregnancy history:              She has the following pregnancy history:                She has no history of  delivery.     Relevant past medical history:(relevant to this pregnancy): noncontributory.       Her occupation is: [employer] .     Substance history: negative for alcohol, tobacco and street drugs.           Positive for nothing.  Exposure history: There is/are indoor cat/s in the home.  The patient was instructed to not change the cat litter.   She admits close contact with children on a regular basis.   She has had chicken pox or the vaccine in the past.

## 2025-02-21 LAB
CREAT UR-MCNC: 215.1 MG/DL
PROT UR-MCNC: 15.3 MG/DL
PROT/CREAT UR: 71 MG/G CREAT (ref 0–200)

## 2025-02-22 LAB — BACTERIA UR CULT: NORMAL

## 2025-02-24 LAB
C TRACH RRNA SPEC QL NAA+PROBE: NEGATIVE
N GONORRHOEA RRNA SPEC QL NAA+PROBE: NEGATIVE
T VAGINALIS RRNA SPEC QL NAA+PROBE: NEGATIVE

## 2025-02-28 LAB
C TRACH RRNA CVX QL NAA+PROBE: NEGATIVE
CYTOLOGIST CVX/VAG CYTO: NORMAL
CYTOLOGY CVX/VAG DOC CYTO: NORMAL
CYTOLOGY CVX/VAG DOC THIN PREP: NORMAL
DX ICD CODE: NORMAL
HPV GENOTYPE REFLEX: NORMAL
HPV I/H RISK 4 DNA CVX QL PROBE+SIG AMP: NEGATIVE
N GONORRHOEA RRNA CVX QL NAA+PROBE: NEGATIVE
OTHER STN SPEC: NORMAL
SERVICE CMNT-IMP: NORMAL
STAT OF ADQ CVX/VAG CYTO-IMP: NORMAL
T VAGINALIS RRNA SPEC QL NAA+PROBE: NEGATIVE

## 2025-03-13 ENCOUNTER — ROUTINE PRENATAL (OUTPATIENT)
Age: 35
End: 2025-03-13

## 2025-03-13 ENCOUNTER — LAB (OUTPATIENT)
Age: 35
End: 2025-03-13

## 2025-03-13 VITALS — BODY MASS INDEX: 38.78 KG/M2 | DIASTOLIC BLOOD PRESSURE: 76 MMHG | SYSTOLIC BLOOD PRESSURE: 138 MMHG | WEIGHT: 247.6 LBS

## 2025-03-13 DIAGNOSIS — O09.90 SUPERVISION OF HIGH RISK PREGNANCY, ANTEPARTUM: Primary | ICD-10-CM

## 2025-03-13 DIAGNOSIS — O26.20 HABITUAL ABORTER, ANTEPARTUM: ICD-10-CM

## 2025-03-13 DIAGNOSIS — Z3A.10 10 WEEKS GESTATION OF PREGNANCY: ICD-10-CM

## 2025-03-13 PROCEDURE — 0502F SUBSEQUENT PRENATAL CARE: CPT | Performed by: OBSTETRICS & GYNECOLOGY

## 2025-03-13 NOTE — PROGRESS NOTES
Patient Active Problem List    Diagnosis Date Noted    Supervision of high risk pregnancy, antepartum 02/20/2025     Overview Note:     EDC 10/7/25 D=US  NIPTS  Horizon  Habitual aborter - nl patient chromosomes, nl thrombophilia profile  Obesity BMI 38  Baby ASA 12 weeks  Hx of LEEP  MFM  Depression - Zoloft  Flu vaccine done  PCR 0.07 baseline

## 2025-03-13 NOTE — PROGRESS NOTES
Ultrasound Result (most recent):  US OB TRANSVAGINAL 03/13/2025    Narrative  CRL (Hadlock) 3.98 cm 4.04 3.92 avg. 86.4% 10w6d  2D Measurements Value m1 m2 m3 m4 m5 m6 Meth.  Left Ovary  Length 2.90 cm 2.90 avg.  Width 1.75 cm 1.75 avg.  Height 1.97 cm 1.97 avg.  Volume 5.235 cm³ 5.235  M-Mode Measurements Value m1 m2 m3 m4 m5 m6 Meth.  Fetal Heart Rate  Ventricular  bpm 161    Transabdominal ultrasound was performed.  Single viable 10-week 6-day IUP is seen with normal cardiac rhythm.  Posterior placenta is seen.  The right adnexa appears normal.  The left ovary appears normal.  There is no free fluid in the cul-de-sac.    Doing well No bleeding  Will ask MARIO ALBERTO about when to stop vaginal progesterone  NIPTS/Horizon today  Check rubeola titers  Early cassy  Has MFM in 2 weeks

## 2025-03-14 LAB — GLUCOSE 1H P 100 G GLC PO SERPL-MCNC: 129 MG/DL (ref 65–140)

## 2025-03-15 ENCOUNTER — RESULTS FOLLOW-UP (OUTPATIENT)
Age: 35
End: 2025-03-15

## 2025-03-15 DIAGNOSIS — O09.90 SUPERVISION OF HIGH RISK PREGNANCY, ANTEPARTUM: Primary | ICD-10-CM

## 2025-03-16 LAB — MEV IGG SER IA-ACNC: >300 AU/ML

## 2025-03-21 ENCOUNTER — RESULTS FOLLOW-UP (OUTPATIENT)
Age: 35
End: 2025-03-21

## 2025-03-21 DIAGNOSIS — O09.90 SUPERVISION OF HIGH RISK PREGNANCY, ANTEPARTUM: Primary | ICD-10-CM

## 2025-03-23 LAB
EGFR GENE MUT TESTED BLD/T: NEGATIVE
KRAS GENE MUT ANL BLD/T: NEGATIVE
Lab: NEGATIVE
Lab: NORMAL
MICROARRAY PLATFORM: NEGATIVE
NTRA ALPHA-THALASSEMIA: NEGATIVE
NTRA BATTEN DISEASE (NEURONAL CEROID LIPOFUSCINOSIS, CLN3-RELATED): NEGATIVE
NTRA BETA-HEMOGLOBINOPATHIES: NEGATIVE
NTRA BLOOM SYNDROME: NEGATIVE
NTRA CANAVAN DISEASE: NEGATIVE
NTRA CITRULLINEMIA, TYPE I: NEGATIVE
NTRA CYSTIC FIBROSIS: NEGATIVE
NTRA DUCHENNE/BECKER MUSCULAR DYSTROPHY: NEGATIVE
NTRA FAMILIAL DYSAUTONOMIA: NEGATIVE
NTRA FANCONI ANEMIA, GROUP C: NEGATIVE
NTRA FRAGILE X SYNDROME: NEGATIVE
NTRA GALACTOSEMIA: NEGATIVE
NTRA GAUCHER DISEASE: NEGATIVE
NTRA GLYCOGEN STORAGE DISEASE, TYPE 1A: NEGATIVE
NTRA ISOVALERIC ACIDEMIA: NEGATIVE
NTRA MEDIUM CHAIN ACYL-COA DEHYDROGENASE DEFICIENCY: NEGATIVE
NTRA METHYLMALONIC ACIDURIA AND HOMOCYSTINURIA, TYPE CBLC: NEGATIVE
NTRA MUCOLIPIDOSIS, TYPE IV: NEGATIVE
NTRA POLYCYSTIC KIDNEY DISEASE, AUTOSOMAL RECESSIVE: NEGATIVE
NTRA SMITH-LEMLI-OPITZ SYNDROME: NEGATIVE
NTRA SPINAL MUSCULAR ATROPHY: NEGATIVE
NTRA TAY-SACHS DISEASE: NEGATIVE
NTRA TYROSINEMIA, TYPE I: NEGATIVE
NTRA ZELLWEGER SPECTRUM DISORDERS, PEX1-RELATED: NEGATIVE

## 2025-03-27 ENCOUNTER — ROUTINE PRENATAL (OUTPATIENT)
Age: 35
End: 2025-03-27

## 2025-03-27 VITALS
BODY MASS INDEX: 38.62 KG/M2 | WEIGHT: 246.6 LBS | HEART RATE: 72 BPM | SYSTOLIC BLOOD PRESSURE: 127 MMHG | DIASTOLIC BLOOD PRESSURE: 79 MMHG

## 2025-03-27 DIAGNOSIS — O09.90 SUPERVISION OF HIGH RISK PREGNANCY, ANTEPARTUM: Primary | ICD-10-CM

## 2025-03-27 DIAGNOSIS — O99.210 OBESITY IN PREGNANCY: ICD-10-CM

## 2025-03-27 DIAGNOSIS — Z3A.12 12 WEEKS GESTATION OF PREGNANCY: ICD-10-CM

## 2025-03-27 DIAGNOSIS — O26.20 HABITUAL ABORTER, ANTEPARTUM: ICD-10-CM

## 2025-03-27 PROCEDURE — 0502F SUBSEQUENT PRENATAL CARE: CPT | Performed by: OBSTETRICS & GYNECOLOGY

## 2025-03-27 NOTE — PROGRESS NOTES
Patient Active Problem List    Diagnosis Date Noted    Supervision of high risk pregnancy, antepartum 02/20/2025     Overview Note:     EDC 10/7/25 D=US  NIPTS normal male  Horizon neg 27  Habitual aborter - nl patient chromosomes, nl thrombophilia profile  Obesity BMI 38  Baby ASA 12 weeks  Hx of LEEP  MFM  Depression - Zoloft  Flu vaccine done  PCR 0.07 baseline  Early glucola 129  Measles immune

## 2025-03-28 DIAGNOSIS — Z34.90 PREGNANCY, UNSPECIFIED GESTATIONAL AGE: Primary | ICD-10-CM

## 2025-03-31 ENCOUNTER — ROUTINE PRENATAL (OUTPATIENT)
Age: 35
End: 2025-03-31
Payer: COMMERCIAL

## 2025-03-31 VITALS
HEART RATE: 106 BPM | SYSTOLIC BLOOD PRESSURE: 119 MMHG | HEIGHT: 67 IN | DIASTOLIC BLOOD PRESSURE: 81 MMHG | BODY MASS INDEX: 38.14 KG/M2 | WEIGHT: 243 LBS

## 2025-03-31 DIAGNOSIS — O99.210 OBESITY IN PREGNANCY, ANTEPARTUM: ICD-10-CM

## 2025-03-31 DIAGNOSIS — O26.90 PREGNANCY COMPLICATION, ANTEPARTUM: Primary | ICD-10-CM

## 2025-03-31 DIAGNOSIS — Z34.90 PREGNANCY, UNSPECIFIED GESTATIONAL AGE: ICD-10-CM

## 2025-03-31 PROCEDURE — 99204 OFFICE O/P NEW MOD 45 MIN: CPT | Performed by: STUDENT IN AN ORGANIZED HEALTH CARE EDUCATION/TRAINING PROGRAM

## 2025-03-31 PROCEDURE — 76801 OB US < 14 WKS SINGLE FETUS: CPT | Performed by: STUDENT IN AN ORGANIZED HEALTH CARE EDUCATION/TRAINING PROGRAM

## 2025-03-31 RX ORDER — ASPIRIN 81 MG/1
81 TABLET, CHEWABLE ORAL DAILY
COMMUNITY

## 2025-03-31 NOTE — PROCEDURES
PATIENT: TRISTIN MENDEZ   -  : 1990   -  DOS:2025   -  INTERPRETING PROVIDER:Mary Martinez,   Indication  ========    Habitual abortions, BMI 38.06    Method  ======    Transabdominal ultrasound examination. View: suboptimal due to maternal acoustic properties. suboptimal due to unfavorable fetal position. suboptimal due to early  gestational age    Pregnancy  =========    Vaca pregnancy. Number of fetuses: 1    Dating  ======    LMP on: 2024  Cycle: regular cycle  GA by LMP 12 w + 6 d  SARA by LMP: 10/7/2025  Previous Ultrasound on: 2025  Type of prior assessment: GA  GA at prior assessment date 7 w + 3 d  GA by previous U/S 13 w + 0 d  SARA by previous Ultrasound: 10/6/2025  Ultrasound examination on: 3/31/2025  GA by U/S based upon: CRL  GA by U/S 13 w + 3 d  SARA by U/S: 10/3/2025  Assigned: based on the LMP, selected on 2025  Assigned GA 12 w + 6 d  Assigned SARA: 10/7/2025    General Evaluation  ==============    Cardiac activity present  Amniotic fluid: normal amount    Fetal Biometry  ============    Standard   bpm  5% Nicolaides  CRL 72.2 mm 13w 3d 79% Hadlock  Extended  Nasal bone: present    Fetal Anatomy  ===========    The following structures appear normal:  Stomach. Bladder.    The following structures were visualized:  Cranium: Midline falx  Choroid plexus. Face: Profile. Abdominal wall: Intact. Arms. Legs.    Maternal Structures  ===============    Ovaries / Tubes / Adnexa  Rt ovary: Not visualized  Lt ovary: Not visualized    Findings  =======    Intrauterine Vaca pregnancy at 12w 6d by clinical dates.  Cardiac activity is present.  Due to early gestation, limited anatomy visualized is stated above.  Right ovary is Not visualized.  Left ovary is Not visualized.    The ultrasound findings as listed above and diagnostic limitations of ultrasound imaging, including inability to exclude all anomalies, have been reviewed with the patient. All  questions and

## 2025-03-31 NOTE — PROGRESS NOTES
Patient was seen 3/31/2025      Please look under media to view full consult and ultrasound report in [employer].         Mary Martinez MD   Maternal Fetal Medicine

## 2025-04-24 NOTE — PATIENT INSTRUCTIONS
Patient Education        Weeks 14 to 18 of Your Pregnancy: Care Instructions  Around this time, you may start to look pregnant. Your baby is now able to pass urine. And the first stool (meconium) is starting to collect in your baby's intestines. Hair is starting to grow on your baby's head.    You may notice some skin changes, such as itchy spots on your palms or acne on your face.   At your next doctor visit, you may have an ultrasound. So you might think about whether you want to know the sex of your baby. Also ask your doctor about flu and COVID-19 shots.      How to reduce stress   Ask for help when you need it.  Try to avoid things that cause you stress.  Seek out things that relieve stress, such as breathing exercises or yoga.     How to get exercise   If you don't usually exercise, start slowly. Short walks may be a good choice.  Try to be active 30 minutes a day, at least 5 days a week.  Avoid activities where you're more likely to fall.  Use light weights to reduce stress on your joints.     How to stay at a healthy weight for you   Talk to your doctor or midwife about how much weight you should gain.  It's generally best to gain:  About 28 to 40 pounds if you're underweight.  About 25 to 35 pounds if you're at a healthy weight.  About 15 to 25 pounds if you're overweight.  About 11 to 20 pounds if you're very overweight (obese).  Follow-up care is a key part of your treatment and safety. Be sure to make and go to all appointments, and call your doctor if you are having problems. It's also a good idea to know your test results and keep a list of the medicines you take.  Where can you learn more?  Go to https://www.[employer].net/patientEd and enter I453 to learn more about \"Weeks 14 to 18 of Your Pregnancy: Care Instructions.\"  Current as of: April 30, 2024  Content Version: 14.4  © 9156-0471 [employer].   Care instructions adapted under license by "[employer]. If you have questions about a

## 2025-04-25 ENCOUNTER — ROUTINE PRENATAL (OUTPATIENT)
Age: 35
End: 2025-04-25

## 2025-04-25 VITALS — BODY MASS INDEX: 38.44 KG/M2 | DIASTOLIC BLOOD PRESSURE: 83 MMHG | SYSTOLIC BLOOD PRESSURE: 136 MMHG | WEIGHT: 245.4 LBS

## 2025-04-25 DIAGNOSIS — Z3A.16 16 WEEKS GESTATION OF PREGNANCY: ICD-10-CM

## 2025-04-25 DIAGNOSIS — O99.210 OBESITY IN PREGNANCY: ICD-10-CM

## 2025-04-25 DIAGNOSIS — O09.90 SUPERVISION OF HIGH RISK PREGNANCY, ANTEPARTUM: ICD-10-CM

## 2025-04-25 DIAGNOSIS — O26.20 HABITUAL ABORTER, ANTEPARTUM: Primary | ICD-10-CM

## 2025-04-25 DIAGNOSIS — Z36.9 ENCOUNTER FOR ANTENATAL SCREENING OF MOTHER: ICD-10-CM

## 2025-04-25 PROCEDURE — 0502F SUBSEQUENT PRENATAL CARE: CPT | Performed by: OBSTETRICS & GYNECOLOGY

## 2025-04-25 ASSESSMENT — PATIENT HEALTH QUESTIONNAIRE - PHQ9
1. LITTLE INTEREST OR PLEASURE IN DOING THINGS: SEVERAL DAYS
SUM OF ALL RESPONSES TO PHQ QUESTIONS 1-9: 1
SUM OF ALL RESPONSES TO PHQ9 QUESTIONS 1 & 2: 1
2. FEELING DOWN, DEPRESSED OR HOPELESS: NOT AT ALL
1. LITTLE INTEREST OR PLEASURE IN DOING THINGS: SEVERAL DAYS
2. FEELING DOWN, DEPRESSED OR HOPELESS: NOT AT ALL
SUM OF ALL RESPONSES TO PHQ QUESTIONS 1-9: 1

## 2025-04-30 LAB
AFP INTERP SERPL-IMP: NORMAL
AFP INTERP SERPL-IMP: NORMAL
AFP MOM SERPL: 1.02
AFP SERPL-MCNC: 29.2 NG/ML
AGE AT DELIVERY: 34 YR
COMMENT: NORMAL
GA METHOD: NORMAL
GA: 16.4 WEEKS
IDDM PATIENT QL: NO
Lab: NORMAL
MULTIPLE PREGNANCY: NO
NEURAL TUBE DEFECT RISK FETUS: NORMAL %

## 2025-05-01 ENCOUNTER — RESULTS FOLLOW-UP (OUTPATIENT)
Age: 35
End: 2025-05-01

## 2025-05-22 ENCOUNTER — ROUTINE PRENATAL (OUTPATIENT)
Age: 35
End: 2025-05-22

## 2025-05-22 VITALS — DIASTOLIC BLOOD PRESSURE: 72 MMHG | SYSTOLIC BLOOD PRESSURE: 127 MMHG | HEART RATE: 83 BPM

## 2025-05-22 VITALS
BODY MASS INDEX: 38.94 KG/M2 | WEIGHT: 248.6 LBS | SYSTOLIC BLOOD PRESSURE: 112 MMHG | HEART RATE: 100 BPM | DIASTOLIC BLOOD PRESSURE: 70 MMHG

## 2025-05-22 DIAGNOSIS — O26.20 HABITUAL ABORTER, ANTEPARTUM: ICD-10-CM

## 2025-05-22 DIAGNOSIS — O09.90 SUPERVISION OF HIGH RISK PREGNANCY, ANTEPARTUM: Primary | ICD-10-CM

## 2025-05-22 DIAGNOSIS — O99.210 OBESITY IN PREGNANCY: ICD-10-CM

## 2025-05-22 DIAGNOSIS — Z34.90 PREGNANCY, UNSPECIFIED GESTATIONAL AGE: ICD-10-CM

## 2025-05-22 DIAGNOSIS — Z3A.20 20 WEEKS GESTATION OF PREGNANCY: ICD-10-CM

## 2025-05-22 PROCEDURE — 0502F SUBSEQUENT PRENATAL CARE: CPT | Performed by: OBSTETRICS & GYNECOLOGY

## 2025-05-22 RX ORDER — ASPIRIN 81 MG/1
81 TABLET ORAL DAILY
COMMUNITY

## 2025-05-22 NOTE — PROCEDURES
shortness of breath, palpitations,  rashes, skeletal problems, gastrointestinal symptoms, urinary symptoms, or any other complaints. Thus far in the pregnancy, she does not report any cramping, bleeding,  or other problems.    2025 1608 I have reviewed the ultrasound images from today. I have reviewed and approved the NP care/treatment plan , as outlined above.  Hardeep Sanchez MD    Recommendations  ==============    Return at 20 weeks for detailed anatomy scan    - Recommend serial growth scans q4 starting at 32 weeks  -  testing weekly starting at 37 weeks  - Delivery: 39.0-40.6.    Coding  ======    Code: 17885  Description: Ultrasound, pregnant uterus, real time with image documentation, fetal and maternal evaluation plus detailed fetal anatomic examination, transabdominal  approach;single or first gestation  Code: 22043  Description: Ultrasound, pregnant uterus, real time with image documentation, transvaginal

## 2025-05-23 NOTE — PROGRESS NOTES
Patient was seen 5/23/2025      Please look under media to view full consult and ultrasound report in ViewPoint.         Hardeep Sanchez MD  Maternal Fetal Medicine

## 2025-05-25 ENCOUNTER — HOSPITAL ENCOUNTER (EMERGENCY)
Facility: HOSPITAL | Age: 35
Discharge: HOME | End: 2025-05-26
Attending: EMERGENCY MEDICINE
Payer: COMMERCIAL

## 2025-05-25 DIAGNOSIS — R11.2 NAUSEA AND VOMITING, UNSPECIFIED VOMITING TYPE: Primary | ICD-10-CM

## 2025-05-25 LAB
ALBUMIN SERPL-MCNC: 3 G/DL (ref 3.5–5)
ALBUMIN/GLOB SERPL: 0.7 (ref 1.1–2.2)
ALP SERPL-CCNC: 81 U/L (ref 45–117)
ALT SERPL-CCNC: 35 U/L (ref 12–78)
ANION GAP SERPL CALC-SCNC: 6 MMOL/L (ref 2–12)
APPEARANCE UR: CLEAR
AST SERPL-CCNC: 23 U/L (ref 15–37)
BACTERIA URNS QL MICRO: NEGATIVE /HPF
BASOPHILS # BLD: 0.04 K/UL (ref 0–0.1)
BASOPHILS NFR BLD: 0.3 % (ref 0–1)
BILIRUB SERPL-MCNC: 0.4 MG/DL (ref 0.2–1)
BILIRUB UR QL: NEGATIVE
BUN SERPL-MCNC: 2 MG/DL (ref 6–20)
BUN/CREAT SERPL: 4 (ref 12–20)
CALCIUM SERPL-MCNC: 9.3 MG/DL (ref 8.5–10.1)
CHLORIDE SERPL-SCNC: 108 MMOL/L (ref 97–108)
CO2 SERPL-SCNC: 22 MMOL/L (ref 21–32)
COLOR UR: NORMAL
CREAT SERPL-MCNC: 0.52 MG/DL (ref 0.55–1.02)
DIFFERENTIAL METHOD BLD: ABNORMAL
EOSINOPHIL # BLD: 0.07 K/UL (ref 0–0.4)
EOSINOPHIL NFR BLD: 0.6 % (ref 0–7)
EPITH CASTS URNS QL MICRO: NORMAL /LPF
ERYTHROCYTE [DISTWIDTH] IN BLOOD BY AUTOMATED COUNT: 14.6 % (ref 11.5–14.5)
GLOBULIN SER CALC-MCNC: 4.3 G/DL (ref 2–4)
GLUCOSE SERPL-MCNC: 72 MG/DL (ref 65–100)
GLUCOSE UR STRIP.AUTO-MCNC: NEGATIVE MG/DL
HCG SERPL-ACNC: ABNORMAL MIU/ML (ref 0–6)
HCT VFR BLD AUTO: 38.6 % (ref 35–47)
HGB BLD-MCNC: 13.4 G/DL (ref 11.5–16)
HGB UR QL STRIP: NEGATIVE
HYALINE CASTS URNS QL MICRO: NORMAL /LPF (ref 0–2)
IMM GRANULOCYTES # BLD AUTO: 0.05 K/UL (ref 0–0.04)
IMM GRANULOCYTES NFR BLD AUTO: 0.4 % (ref 0–0.5)
KETONES UR QL STRIP.AUTO: NEGATIVE MG/DL
LEUKOCYTE ESTERASE UR QL STRIP.AUTO: NEGATIVE
LIPASE SERPL-CCNC: 25 U/L (ref 13–75)
LYMPHOCYTES # BLD: 2.27 K/UL (ref 0.8–3.5)
LYMPHOCYTES NFR BLD: 19.5 % (ref 12–49)
MCH RBC QN AUTO: 30.3 PG (ref 26–34)
MCHC RBC AUTO-ENTMCNC: 34.7 G/DL (ref 30–36.5)
MCV RBC AUTO: 87.3 FL (ref 80–99)
MONOCYTES # BLD: 0.71 K/UL (ref 0–1)
MONOCYTES NFR BLD: 6.1 % (ref 5–13)
NEUTS SEG # BLD: 8.52 K/UL (ref 1.8–8)
NEUTS SEG NFR BLD: 73.1 % (ref 32–75)
NITRITE UR QL STRIP.AUTO: NEGATIVE
NRBC # BLD: 0 K/UL (ref 0–0.01)
NRBC BLD-RTO: 0 PER 100 WBC
PH UR STRIP: 7 (ref 5–8)
PLATELET # BLD AUTO: 160 K/UL (ref 150–400)
PMV BLD AUTO: 12.4 FL (ref 8.9–12.9)
POTASSIUM SERPL-SCNC: 3.6 MMOL/L (ref 3.5–5.1)
PROT SERPL-MCNC: 7.3 G/DL (ref 6.4–8.2)
PROT UR STRIP-MCNC: NEGATIVE MG/DL
RBC # BLD AUTO: 4.42 M/UL (ref 3.8–5.2)
RBC #/AREA URNS HPF: NORMAL /HPF (ref 0–5)
SODIUM SERPL-SCNC: 136 MMOL/L (ref 136–145)
SP GR UR REFRACTOMETRY: <1.005 (ref 1–1.03)
URINE CULTURE IF INDICATED: NORMAL
UROBILINOGEN UR QL STRIP.AUTO: 0.2 EU/DL (ref 0.2–1)
WBC # BLD AUTO: 11.7 K/UL (ref 3.6–11)
WBC URNS QL MICRO: NORMAL /HPF (ref 0–4)

## 2025-05-25 PROCEDURE — 6360000002 HC RX W HCPCS

## 2025-05-25 PROCEDURE — 84702 CHORIONIC GONADOTROPIN TEST: CPT

## 2025-05-25 PROCEDURE — 99284 EMERGENCY DEPT VISIT MOD MDM: CPT

## 2025-05-25 PROCEDURE — 96375 TX/PRO/DX INJ NEW DRUG ADDON: CPT

## 2025-05-25 PROCEDURE — 80053 COMPREHEN METABOLIC PANEL: CPT

## 2025-05-25 PROCEDURE — 36415 COLL VENOUS BLD VENIPUNCTURE: CPT

## 2025-05-25 PROCEDURE — 96374 THER/PROPH/DIAG INJ IV PUSH: CPT

## 2025-05-25 PROCEDURE — 85025 COMPLETE CBC W/AUTO DIFF WBC: CPT

## 2025-05-25 PROCEDURE — 83690 ASSAY OF LIPASE: CPT

## 2025-05-25 PROCEDURE — 81001 URINALYSIS AUTO W/SCOPE: CPT

## 2025-05-25 PROCEDURE — 2580000003 HC RX 258

## 2025-05-25 RX ORDER — DIPHENHYDRAMINE HYDROCHLORIDE 50 MG/ML
25 INJECTION, SOLUTION INTRAMUSCULAR; INTRAVENOUS ONCE
Status: COMPLETED | OUTPATIENT
Start: 2025-05-25 | End: 2025-05-25

## 2025-05-25 RX ORDER — PROCHLORPERAZINE EDISYLATE 5 MG/ML
10 INJECTION INTRAMUSCULAR; INTRAVENOUS ONCE
Status: DISCONTINUED | OUTPATIENT
Start: 2025-05-25 | End: 2025-05-25

## 2025-05-25 RX ORDER — PROCHLORPERAZINE EDISYLATE 5 MG/ML
10 INJECTION INTRAMUSCULAR; INTRAVENOUS EVERY 6 HOURS PRN
Status: DISCONTINUED | OUTPATIENT
Start: 2025-05-25 | End: 2025-05-25

## 2025-05-25 RX ORDER — 0.9 % SODIUM CHLORIDE 0.9 %
1000 INTRAVENOUS SOLUTION INTRAVENOUS ONCE
Status: COMPLETED | OUTPATIENT
Start: 2025-05-25 | End: 2025-05-26

## 2025-05-25 RX ORDER — PROCHLORPERAZINE 25 MG
25 SUPPOSITORY, RECTAL RECTAL EVERY 12 HOURS PRN
Qty: 60 SUPPOSITORY | Refills: 1 | Status: SHIPPED | OUTPATIENT
Start: 2025-05-25

## 2025-05-25 RX ADMIN — PROCHLORPERAZINE EDISYLATE 10 MG: 5 INJECTION INTRAMUSCULAR; INTRAVENOUS at 22:48

## 2025-05-25 RX ADMIN — SODIUM CHLORIDE 1000 ML: 0.9 INJECTION, SOLUTION INTRAVENOUS at 22:48

## 2025-05-25 RX ADMIN — DIPHENHYDRAMINE HYDROCHLORIDE 25 MG: 50 INJECTION INTRAMUSCULAR; INTRAVENOUS at 22:48

## 2025-05-25 ASSESSMENT — LIFESTYLE VARIABLES
HOW MANY STANDARD DRINKS CONTAINING ALCOHOL DO YOU HAVE ON A TYPICAL DAY: PATIENT DOES NOT DRINK
HOW OFTEN DO YOU HAVE A DRINK CONTAINING ALCOHOL: NEVER

## 2025-05-26 VITALS
BODY MASS INDEX: 38.72 KG/M2 | DIASTOLIC BLOOD PRESSURE: 89 MMHG | SYSTOLIC BLOOD PRESSURE: 100 MMHG | RESPIRATION RATE: 20 BRPM | WEIGHT: 246.69 LBS | OXYGEN SATURATION: 97 % | HEART RATE: 84 BPM | TEMPERATURE: 98.1 F | HEIGHT: 67 IN

## 2025-05-26 PROCEDURE — 96361 HYDRATE IV INFUSION ADD-ON: CPT

## 2025-05-26 NOTE — ED PROVIDER NOTES
[facility] EMERGENCY DEPARTMENT  EMERGENCY DEPARTMENT ENCOUNTER      Pt Name: Kori Rashid  MRN: [id number]  Birthdate 1990  Date of evaluation: 5/25/2025  Provider: Raj Yates PA-C    CHIEF COMPLAINT       Chief Complaint   Patient presents with    Nausea         HISTORY OF PRESENT ILLNESS   (Location/Symptom, Timing/Onset, Context/Setting, Quality, Duration, Modifying Factors, Severity)  Note limiting factors.   HPI  34-year-old female G4, P0 20 weeks 5 days presenting with nausea, vomiting since Thursday.  Reports this started after eating at [employer].  She reports she follows with Dr. Crowley as her OB/GYN.  No complications this pregnancy.  She states that earlier today she did have some lower abdominal pain but this is since resolved.  Reports when she used the bathroom when she wiped she noticed a small quantity of blood on the toilet tissue again to spotting.  Reports no abdominal pain right now.  Denies urinary symptoms.    Review of External Medical Records:     Nursing Notes were reviewed.    REVIEW OF SYSTEMS    (2-9 systems for level 4, 10 or more for level 5)     Review of Systems   Gastrointestinal:  Positive for nausea and vomiting.       Except as noted above the remainder of the review of systems was reviewed and negative.       PAST MEDICAL HISTORY     Past Medical History:   Diagnosis Date    Abnormal Pap smear of cervix 9/22/22    Anemia     Pena's cyst     Depression     GERD (gastroesophageal reflux disease)     no longer taking meds    Papanicolaou smear for cervical cancer screening 02/20/2025    Normal         SURGICAL HISTORY       Past Surgical History:   Procedure Laterality Date    HYSTEROSCOPY  09/2023    removed a few polyps    LEEP N/A 09/27/2023    LOOP ELECTROSURGICAL EXCISION PROCEDURE performed by Oralia Crowley MD at [facility] MAIN OR    UPPER GASTROINTESTINAL ENDOSCOPY      WISDOM TOOTH EXTRACTION           CURRENT MEDICATIONS       Discharge

## 2025-05-26 NOTE — CONSULTS
Ob Consult    Name: Kori Rashid MRN: [id number]  SSN: [id number]    YOB: 1990  Age: 34 y.o.  Sex: female        Subjective:     Estimated Date of Delivery: 10/7/25  OB History          4    Para        Term                AB   3    Living             SAB   3    IAB        Ectopic        Molar        Multiple        Live Births                    Ms. Rashid is a 35 yo  who presents with pregnancy at 20w5d based upon LMP and 7 wk ultrasound that reports symptoms of food poisoning after eating mac and cheese from [employer] on Thursday. Reports persistent nausea and vomiting, headache, intermittent lower abdominal cramping and episode of pink spotting today when wiping after urination. Also expresses concerns about not feeling her baby move as much as usual. She has been unable to keep down solid food and most liquids. Today she has been able to keep down water and a smoothie. She was previously prescribed zofran for nausea and vomiting of pregnancy and states that the zofran has resulted in improvement of her nausea.   Denies fever, chills, diarrhea, and leakage of fluid.  Prenatal course obesity and habitual SAB. Please see prenatal records for details.        Past Medical History:   Diagnosis Date    Abnormal Pap smear of cervix 22    Anemia     Pena's cyst     Depression     GERD (gastroesophageal reflux disease)     no longer taking meds    Papanicolaou smear for cervical cancer screening 2025    Normal     Past Surgical History:   Procedure Laterality Date    HYSTEROSCOPY  2023    removed a few polyps    LEEP N/A 2023    LOOP ELECTROSURGICAL EXCISION PROCEDURE performed by Oralia Crowley MD at [facility] MAIN OR    UPPER GASTROINTESTINAL ENDOSCOPY      WISDOM TOOTH EXTRACTION       Social History     Occupational History    Not on file   Tobacco Use    Smoking status: Never    Smokeless tobacco: Never   Vaping Use    Vaping status: Never Used   Substance and

## 2025-05-26 NOTE — ED TRIAGE NOTES
ED visit d/t unable to keep PO fluids down - onset of sxs, Thursday - Endorses, persistent sxs at home s/p eating fast food \" had some bad mac and cheese \" - active pregnancy, , roughly 20 wks - Denies fevers / lower back pain / lower abd pain / active vomiting / sick contacts / sob / cp / dizziness / falls nor trauma;;

## 2025-06-16 ENCOUNTER — TELEPHONE (OUTPATIENT)
Age: 35
End: 2025-06-16

## 2025-06-16 NOTE — TELEPHONE ENCOUNTER
Lvm for patient to call office at [phone] to change time of appointment on 6/20. *Trying to move to 845 room 2

## 2025-06-19 ENCOUNTER — ROUTINE PRENATAL (OUTPATIENT)
Age: 35
End: 2025-06-19

## 2025-06-19 VITALS — WEIGHT: 250.4 LBS | DIASTOLIC BLOOD PRESSURE: 71 MMHG | BODY MASS INDEX: 39.22 KG/M2 | SYSTOLIC BLOOD PRESSURE: 126 MMHG

## 2025-06-19 DIAGNOSIS — O99.210 OBESITY IN PREGNANCY: ICD-10-CM

## 2025-06-19 DIAGNOSIS — O09.90 SUPERVISION OF HIGH RISK PREGNANCY, ANTEPARTUM: Primary | ICD-10-CM

## 2025-06-19 DIAGNOSIS — Z3A.24 24 WEEKS GESTATION OF PREGNANCY: ICD-10-CM

## 2025-06-19 DIAGNOSIS — O26.20 HABITUAL ABORTER, ANTEPARTUM: ICD-10-CM

## 2025-06-19 DIAGNOSIS — M54.30 SCIATICA, UNSPECIFIED LATERALITY: ICD-10-CM

## 2025-06-19 PROCEDURE — 0502F SUBSEQUENT PRENATAL CARE: CPT | Performed by: OBSTETRICS & GYNECOLOGY

## 2025-06-20 ENCOUNTER — ROUTINE PRENATAL (OUTPATIENT)
Age: 35
End: 2025-06-20

## 2025-06-20 VITALS — DIASTOLIC BLOOD PRESSURE: 78 MMHG | OXYGEN SATURATION: 96 % | HEART RATE: 95 BPM | SYSTOLIC BLOOD PRESSURE: 122 MMHG

## 2025-06-20 DIAGNOSIS — Z34.90 PREGNANCY, UNSPECIFIED GESTATIONAL AGE: ICD-10-CM

## 2025-06-20 NOTE — PROGRESS NOTES
Patient was seen 6/20/2025      Please look under media to view full consult and ultrasound report in ViewPoint.         Hardeep Sanchez MD  Maternal Fetal Medicine    numerical 0-10

## 2025-06-20 NOTE — PROCEDURES
associated with an increased risk for pulmonary hypertension in the  (<1%) as well as  withdrawal symptoms such as  respiratory distress, impaired muscle tone, irritability, altered sleep patterns, tremors, and difficulty sleeping (~10%). We discussed the utilization in the 3rd-trimester  should be based on maternal symptoms and the patient's personal assessment of the risk of fetal exposure as compared to her need. Of note, SSRIs do pass through in  breast milk (~10-20% of the drug which is normally found in maternal serum) and therefore will be transmitted to the . Most published reports have found no harmful  effects on the nursing infant. A few studies have noted  irritability, vomiting, diarrhea, and/or decreased sleep. However, because the long-term effects on the nursing  infant is unknown, use should be considered only if the benefits clearly outweigh the risks.    Recommendations  ==============      - Recommend serial growth scans q4 starting at 32 weeks  -  testing weekly starting at 37 weeks  - Delivery: 39.0-40.6.    Coding  ======    Code: 66510  Description: Ultrasound, pregnant uterus, real time with image documentation, follow up, transabdominal approach per fetus

## 2025-07-10 ENCOUNTER — TELEPHONE (OUTPATIENT)
Age: 35
End: 2025-07-10

## 2025-07-10 NOTE — TELEPHONE ENCOUNTER
Two patient identifiers used        34 year old patient   27w2d pregnant.    Gale from [employer] calling to let the office know that the patient is enrolled in their maternity program , for education and support.      Contact information if needed is Gale at  ext [phone]

## 2025-07-18 ENCOUNTER — ROUTINE PRENATAL (OUTPATIENT)
Age: 35
End: 2025-07-18

## 2025-07-18 VITALS — DIASTOLIC BLOOD PRESSURE: 75 MMHG | WEIGHT: 254.4 LBS | BODY MASS INDEX: 39.84 KG/M2 | SYSTOLIC BLOOD PRESSURE: 135 MMHG

## 2025-07-18 DIAGNOSIS — O26.20 HABITUAL ABORTER, ANTEPARTUM: ICD-10-CM

## 2025-07-18 DIAGNOSIS — O99.210 OBESITY IN PREGNANCY: ICD-10-CM

## 2025-07-18 DIAGNOSIS — Z3A.28 28 WEEKS GESTATION OF PREGNANCY: ICD-10-CM

## 2025-07-18 DIAGNOSIS — O09.90 SUPERVISION OF HIGH RISK PREGNANCY, ANTEPARTUM: Primary | ICD-10-CM

## 2025-07-18 PROCEDURE — 0502F SUBSEQUENT PRENATAL CARE: CPT | Performed by: OBSTETRICS & GYNECOLOGY

## 2025-07-18 NOTE — PROGRESS NOTES
After obtaining consent, and per orders of , injection of TDAP given in Left Deltoid by Blayne Charlton MA. Patient instructed to remain in clinic for 20 minutes afterwards, and to report any adverse reaction to me immediately. Lot: [id number] Exp: 08/29/2027 NDC: 36518-357-43 , VIS given.

## 2025-07-19 LAB
ERYTHROCYTE [DISTWIDTH] IN BLOOD BY AUTOMATED COUNT: 14.5 % (ref 11.5–14.5)
GLUCOSE 1H P 100 G GLC PO SERPL-MCNC: 123 MG/DL (ref 65–140)
HCT VFR BLD AUTO: 38.8 % (ref 35–47)
HGB BLD-MCNC: 12.7 G/DL (ref 11.5–16)
MCH RBC QN AUTO: 31.2 PG (ref 26–34)
MCHC RBC AUTO-ENTMCNC: 32.7 G/DL (ref 30–36.5)
MCV RBC AUTO: 95.3 FL (ref 80–99)
NRBC # BLD: 0 K/UL (ref 0–0.01)
NRBC BLD-RTO: 0 PER 100 WBC
PLATELET # BLD AUTO: 135 K/UL (ref 150–400)
RBC # BLD AUTO: 4.07 M/UL (ref 3.8–5.2)
WBC # BLD AUTO: 10.1 K/UL (ref 3.6–11)

## 2025-07-20 LAB
INTERPRETATION: NORMAL
T PALLIDUM AB SER QL IA: NON REACTIVE

## 2025-07-28 ENCOUNTER — HOSPITAL ENCOUNTER (OUTPATIENT)
Facility: HOSPITAL | Age: 35
Setting detail: RECURRING SERIES
Discharge: HOME | End: 2025-07-31
Attending: OBSTETRICS & GYNECOLOGY
Payer: COMMERCIAL

## 2025-07-28 PROCEDURE — 97112 NEUROMUSCULAR REEDUCATION: CPT

## 2025-07-28 PROCEDURE — 97162 PT EVAL MOD COMPLEX 30 MIN: CPT

## 2025-07-28 PROCEDURE — 97535 SELF CARE MNGMENT TRAINING: CPT

## 2025-07-28 NOTE — THERAPY EVALUATION
Physical Therapy at [place],   a part of [address], [address]  [place] [address]  Phone: [phone]  Fax: [phone]     PHYSICAL THERAPY - MEDICARE EVALUATION/PLAN OF CARE NOTE (updated 3/23)    Date: 2025        Patient Name:  Kori Rashid :  1990   Medical   Diagnosis:  Supervision of high risk pregnancy, antepartum [O09.90]  24 weeks gestation of pregnancy [Z3A.24] Treatment Diagnosis:  M54.31  SCIATICA, RIGHT SIDE and M54.32  SCIATICA, LEFT SIDE  and N39.3    STRESS INCONTINENCE (FEMALE) (MALE)    Referral Source:  Oralia Crowley MD Provider #:  [id number]                Insurance: [place] / [place] / Product Type: *No Product type* /      Patient  verified yes     Visit #   Current  / Total 1 12   Time   In / Out 310p 402p   Total Treatment Time 52   Total Timed Codes 25   1:1 Treatment Time 25    [facility] Totals Reminder:  bill using total billable   min of TIMED therapeutic procedures and modalities.   8-22 min = 1 unit; 23-37 min = 2 units; 38-52 min = 3 units;  53-67 min = 4 units; 68-82 min = 5 units     SUBJECTIVE  If an interpreting service was utilized for treatment of this patient, the contents of this document represent the material reviewed with the patient via the .     Pain Level (0-10 scale): 3 current, 9 worst  []constant []intermittent []improving []worsening []no change since onset    Any medication changes, allergies to medications, adverse drug reactions, diagnosis change, or new procedure performed?: [x] No    [] Yes (see summary sheet for update)  Medications: Verified on Patient Summary List    Subjective functional status/changes:       Has had sciatic pain for a couple years but worse in pregnancy since second trimester. Starts in L low back and radiates to back of L thigh. Can happen in R but rarely. Worse with walking and physical activity or prolonged standing. Can also

## 2025-08-07 ENCOUNTER — ROUTINE PRENATAL (OUTPATIENT)
Age: 35
End: 2025-08-07

## 2025-08-07 VITALS
SYSTOLIC BLOOD PRESSURE: 102 MMHG | OXYGEN SATURATION: 98 % | BODY MASS INDEX: 40.25 KG/M2 | DIASTOLIC BLOOD PRESSURE: 78 MMHG | WEIGHT: 257 LBS | HEART RATE: 98 BPM

## 2025-08-07 DIAGNOSIS — O26.20 HABITUAL ABORTER, ANTEPARTUM: ICD-10-CM

## 2025-08-07 DIAGNOSIS — Z3A.31 31 WEEKS GESTATION OF PREGNANCY: ICD-10-CM

## 2025-08-07 DIAGNOSIS — O09.90 SUPERVISION OF HIGH RISK PREGNANCY, ANTEPARTUM: Primary | ICD-10-CM

## 2025-08-07 DIAGNOSIS — O99.210 OBESITY IN PREGNANCY: ICD-10-CM

## 2025-08-07 LAB
ABO + RH BLD: NORMAL
BLOOD BANK CMNT PATIENT-IMP: NORMAL
BLOOD GROUP ANTIBODIES SERPL: NORMAL
SPECIMEN EXP DATE BLD: NORMAL

## 2025-08-07 PROCEDURE — 0502F SUBSEQUENT PRENATAL CARE: CPT | Performed by: OBSTETRICS & GYNECOLOGY

## 2025-08-08 LAB
HBV SURFACE AG SER QL: NONREACTIVE
HCV AB SER QL: NONREACTIVE
HIV 1+2 AB+HIV1 P24 AG SERPL QL IA: NONREACTIVE
HIV 1/2 RESULT COMMENT: NORMAL
RUBV IGG SERPL IA-ACNC: 118 IU/ML

## 2025-08-09 LAB — VZV IGG SER IA-ACNC: REACTIVE

## 2025-08-10 LAB — T PALLIDUM AB SER QL IA: NON REACTIVE

## 2025-08-12 ENCOUNTER — ROUTINE PRENATAL (OUTPATIENT)
Age: 35
End: 2025-08-12
Payer: COMMERCIAL

## 2025-08-12 VITALS — DIASTOLIC BLOOD PRESSURE: 74 MMHG | SYSTOLIC BLOOD PRESSURE: 128 MMHG | HEART RATE: 87 BPM

## 2025-08-12 DIAGNOSIS — O26.23 RECURRENT PREGNANCY LOSS IN PREGNANT PATIENT IN THIRD TRIMESTER, ANTEPARTUM: ICD-10-CM

## 2025-08-12 DIAGNOSIS — F32.A DEPRESSION AFFECTING PREGNANCY IN THIRD TRIMESTER, ANTEPARTUM: ICD-10-CM

## 2025-08-12 DIAGNOSIS — O99.213 OBESITY AFFECTING PREGNANCY IN THIRD TRIMESTER, UNSPECIFIED OBESITY TYPE: Primary | ICD-10-CM

## 2025-08-12 DIAGNOSIS — O99.343 DEPRESSION AFFECTING PREGNANCY IN THIRD TRIMESTER, ANTEPARTUM: ICD-10-CM

## 2025-08-12 DIAGNOSIS — Z3A.32 32 WEEKS GESTATION OF PREGNANCY: ICD-10-CM

## 2025-08-12 PROCEDURE — 76816 OB US FOLLOW-UP PER FETUS: CPT | Performed by: OBSTETRICS & GYNECOLOGY

## 2025-08-12 PROCEDURE — 99024 POSTOP FOLLOW-UP VISIT: CPT | Performed by: OBSTETRICS & GYNECOLOGY

## 2025-08-19 LAB — T PALLIDUM AB SER QL IA: NON REACTIVE

## 2025-08-25 ENCOUNTER — ROUTINE PRENATAL (OUTPATIENT)
Age: 35
End: 2025-08-25

## 2025-08-25 VITALS — SYSTOLIC BLOOD PRESSURE: 120 MMHG | BODY MASS INDEX: 40.69 KG/M2 | WEIGHT: 259.8 LBS | DIASTOLIC BLOOD PRESSURE: 82 MMHG

## 2025-08-25 DIAGNOSIS — O09.90 SUPERVISION OF HIGH RISK PREGNANCY, ANTEPARTUM: Primary | ICD-10-CM

## 2025-08-25 DIAGNOSIS — Z3A.33 33 WEEKS GESTATION OF PREGNANCY: ICD-10-CM

## 2025-08-25 DIAGNOSIS — O99.210 OBESITY IN PREGNANCY: ICD-10-CM

## 2025-08-25 DIAGNOSIS — O26.20 HABITUAL ABORTER, ANTEPARTUM: ICD-10-CM

## 2025-08-25 PROCEDURE — 0502F SUBSEQUENT PRENATAL CARE: CPT | Performed by: OBSTETRICS & GYNECOLOGY

## 2025-08-26 LAB
ERYTHROCYTE [DISTWIDTH] IN BLOOD BY AUTOMATED COUNT: 13.9 % (ref 11.5–14.5)
HCT VFR BLD AUTO: 40.3 % (ref 35–47)
HGB BLD-MCNC: 13.7 G/DL (ref 11.5–16)
MCH RBC QN AUTO: 31.3 PG (ref 26–34)
MCHC RBC AUTO-ENTMCNC: 34 G/DL (ref 30–36.5)
MCV RBC AUTO: 92 FL (ref 80–99)
NRBC # BLD: 0 K/UL (ref 0–0.01)
NRBC BLD-RTO: 0 PER 100 WBC
PLATELET # BLD AUTO: 111 K/UL (ref 150–400)
RBC # BLD AUTO: 4.38 M/UL (ref 3.8–5.2)
WBC # BLD AUTO: 9.1 K/UL (ref 3.6–11)

## 2025-08-28 ENCOUNTER — OFFICE VISIT (OUTPATIENT)
Age: 35
End: 2025-08-28

## 2025-08-28 VITALS
RESPIRATION RATE: 18 BRPM | DIASTOLIC BLOOD PRESSURE: 85 MMHG | BODY MASS INDEX: 40.65 KG/M2 | SYSTOLIC BLOOD PRESSURE: 126 MMHG | HEIGHT: 67 IN | HEART RATE: 105 BPM | OXYGEN SATURATION: 96 % | TEMPERATURE: 98.8 F | WEIGHT: 259 LBS

## 2025-08-28 DIAGNOSIS — R09.81 CONGESTION OF NASAL SINUS: ICD-10-CM

## 2025-08-28 DIAGNOSIS — J06.9 VIRAL UPPER RESPIRATORY TRACT INFECTION: Primary | ICD-10-CM

## 2025-08-28 LAB
Lab: NORMAL
PERFORMING INSTRUMENT: NORMAL
QC PASS/FAIL: NORMAL
QUICKVUE INFLUENZA TEST: NEGATIVE
SARS-COV-2, POC: NORMAL
VALID INTERNAL CONTROL, POC: NORMAL

## 2025-08-28 ASSESSMENT — ENCOUNTER SYMPTOMS
GASTROINTESTINAL NEGATIVE: 1
SHORTNESS OF BREATH: 0
SINUS PAIN: 0
CHEST TIGHTNESS: 0
COUGH: 1
WHEEZING: 0
SORE THROAT: 1

## 2025-08-30 ENCOUNTER — TRANSCRIBE ORDERS (OUTPATIENT)
Facility: HOSPITAL | Age: 35
End: 2025-08-30

## 2025-08-30 DIAGNOSIS — H90.42 SNSRNRL HEAR LOSS, UNI, LEFT EAR, W UNRESTR HEAR CNTRA SIDE: Primary | ICD-10-CM

## (undated) DEVICE — SYRINGE MED 10ML TRNSLUC BRL PLUNG BLK MRK POLYPR CTRL

## (undated) DEVICE — NEEDLE SPNL 20GA L3.5IN YEL HUB S STL REG WALL FIT STYL W/

## (undated) DEVICE — COUNTER NDL 20 COUNT HLD 40 DBL MAG ADH

## (undated) DEVICE — SWABS COTTON OB/GYN W/RAYON TIP 8 IN 2 PER PK

## (undated) DEVICE — GLOVE SURG SZ 65 THK91MIL LTX FREE SYN POLYISOPRENE

## (undated) DEVICE — ELECTRODE PT RET AD L9FT HI MOIST COND ADH HYDRGEL CORDED

## (undated) DEVICE — TUBING, SUCTION, 1/4" X 12', STRAIGHT: Brand: MEDLINE

## (undated) DEVICE — PERI GYN-SFMC: Brand: MEDLINE INDUSTRIES, INC.

## (undated) DEVICE — PENCIL ES L3M BTTN SWCH S STL HEX LOK BLDE ELECTRD HOLSTER

## (undated) DEVICE — SOLUTION IRRIG 1000ML 0.9% SOD CHL USP POUR PLAS BTL

## (undated) DEVICE — ELECTRODE ES L11CM DIA5MM BALL SHFT RED DISP UTAHLOOP